# Patient Record
Sex: MALE | Race: WHITE | NOT HISPANIC OR LATINO | Employment: OTHER | URBAN - METROPOLITAN AREA
[De-identification: names, ages, dates, MRNs, and addresses within clinical notes are randomized per-mention and may not be internally consistent; named-entity substitution may affect disease eponyms.]

---

## 2023-01-28 PROBLEM — I10 HTN (HYPERTENSION): Status: ACTIVE | Noted: 2023-01-28

## 2023-01-28 PROBLEM — R73.03 PREDIABETES: Status: ACTIVE | Noted: 2023-01-28

## 2023-01-28 PROBLEM — R91.8 PULMONARY NODULES/LESIONS, MULTIPLE: Status: ACTIVE | Noted: 2023-01-28

## 2023-01-28 PROBLEM — I25.10 CAD IN NATIVE ARTERY: Status: ACTIVE | Noted: 2023-01-28

## 2023-01-28 PROBLEM — K63.5 COLON POLYPS: Status: ACTIVE | Noted: 2023-01-28

## 2023-01-28 PROBLEM — G47.30 OBSERVED SLEEP APNEA: Status: ACTIVE | Noted: 2023-01-28

## 2023-01-28 PROBLEM — M25.561 RIGHT KNEE PAIN: Status: ACTIVE | Noted: 2023-01-28

## 2023-01-28 PROBLEM — D22.9 MULTIPLE NEVI: Status: ACTIVE | Noted: 2023-01-28

## 2023-01-28 PROBLEM — E78.2 MIXED HYPERLIPIDEMIA: Status: ACTIVE | Noted: 2023-01-28

## 2023-01-28 PROBLEM — M17.11 PRIMARY OSTEOARTHRITIS OF RIGHT KNEE: Status: ACTIVE | Noted: 2023-01-28

## 2023-01-28 PROBLEM — R35.1 NOCTURIA: Status: ACTIVE | Noted: 2023-01-28

## 2023-01-28 RX ORDER — PHENOL 1.4 %
AEROSOL, SPRAY (ML) MUCOUS MEMBRANE
COMMUNITY
Start: 2017-09-26

## 2023-01-28 RX ORDER — VITAMIN B COMPLEX
CAPSULE ORAL
COMMUNITY

## 2023-01-28 RX ORDER — ASPIRIN 81 MG/1
1 TABLET ORAL DAILY
COMMUNITY
End: 2024-03-13 | Stop reason: ALTCHOICE

## 2023-01-28 RX ORDER — ATORVASTATIN CALCIUM 40 MG/1
1 TABLET, FILM COATED ORAL NIGHTLY
COMMUNITY
Start: 2019-04-17 | End: 2023-03-22

## 2023-01-28 RX ORDER — CHOLECALCIFEROL (VITAMIN D3) 125 MCG
CAPSULE ORAL
COMMUNITY

## 2023-01-28 RX ORDER — LISINOPRIL AND HYDROCHLOROTHIAZIDE 20; 25 MG/1; MG/1
1 TABLET ORAL DAILY
COMMUNITY
Start: 2017-07-25 | End: 2023-03-22

## 2023-03-13 ENCOUNTER — OFFICE VISIT (OUTPATIENT)
Dept: PRIMARY CARE | Facility: CLINIC | Age: 68
End: 2023-03-13
Payer: MEDICARE

## 2023-03-13 VITALS
TEMPERATURE: 97.5 F | BODY MASS INDEX: 34.73 KG/M2 | SYSTOLIC BLOOD PRESSURE: 118 MMHG | WEIGHT: 215.2 LBS | DIASTOLIC BLOOD PRESSURE: 84 MMHG

## 2023-03-13 DIAGNOSIS — R73.03 PREDIABETES: ICD-10-CM

## 2023-03-13 DIAGNOSIS — R35.1 NOCTURIA: ICD-10-CM

## 2023-03-13 DIAGNOSIS — I10 HYPERTENSION, UNSPECIFIED TYPE: ICD-10-CM

## 2023-03-13 DIAGNOSIS — I25.10 CAD IN NATIVE ARTERY: ICD-10-CM

## 2023-03-13 DIAGNOSIS — N52.8 OTHER MALE ERECTILE DYSFUNCTION: Primary | ICD-10-CM

## 2023-03-13 LAB — POC HEMOGLOBIN A1C: 6 % (ref 4.2–6.5)

## 2023-03-13 PROCEDURE — 83036 HEMOGLOBIN GLYCOSYLATED A1C: CPT | Performed by: FAMILY MEDICINE

## 2023-03-13 PROCEDURE — 3074F SYST BP LT 130 MM HG: CPT | Performed by: FAMILY MEDICINE

## 2023-03-13 PROCEDURE — 36416 COLLJ CAPILLARY BLOOD SPEC: CPT | Performed by: FAMILY MEDICINE

## 2023-03-13 PROCEDURE — 99214 OFFICE O/P EST MOD 30 MIN: CPT | Performed by: FAMILY MEDICINE

## 2023-03-13 PROCEDURE — 3079F DIAST BP 80-89 MM HG: CPT | Performed by: FAMILY MEDICINE

## 2023-03-13 PROCEDURE — 1159F MED LIST DOCD IN RCRD: CPT | Performed by: FAMILY MEDICINE

## 2023-03-13 PROCEDURE — 1036F TOBACCO NON-USER: CPT | Performed by: FAMILY MEDICINE

## 2023-03-13 RX ORDER — TADALAFIL 20 MG/1
20 TABLET ORAL DAILY PRN
Qty: 30 TABLET | Refills: 0 | Status: SHIPPED | OUTPATIENT
Start: 2023-03-13 | End: 2024-03-13 | Stop reason: SDUPTHER

## 2023-03-13 RX ORDER — MULTIVIT WITH IRON,MINERALS
TABLET,CHEWABLE ORAL DAILY
COMMUNITY

## 2023-03-13 ASSESSMENT — ENCOUNTER SYMPTOMS
LOSS OF SENSATION IN FEET: 0
SHORTNESS OF BREATH: 0
EYE PAIN: 0
ARTHRALGIAS: 0
PALPITATIONS: 0
FEVER: 0
WEAKNESS: 0
ABDOMINAL PAIN: 0
APPETITE CHANGE: 0
DYSPHORIC MOOD: 0
BRUISES/BLEEDS EASILY: 0
NERVOUS/ANXIOUS: 0
COUGH: 0
CONSTIPATION: 0
DIARRHEA: 0
NAUSEA: 0
DIFFICULTY URINATING: 0
BLOOD IN STOOL: 0
VOMITING: 0
OCCASIONAL FEELINGS OF UNSTEADINESS: 0
SINUS PAIN: 0
MYALGIAS: 0
DEPRESSION: 0
ADENOPATHY: 0
SLEEP DISTURBANCE: 0
HEADACHES: 0
ACTIVITY CHANGE: 0

## 2023-03-13 ASSESSMENT — PATIENT HEALTH QUESTIONNAIRE - PHQ9
SUM OF ALL RESPONSES TO PHQ9 QUESTIONS 1 AND 2: 0
2. FEELING DOWN, DEPRESSED OR HOPELESS: NOT AT ALL
1. LITTLE INTEREST OR PLEASURE IN DOING THINGS: NOT AT ALL

## 2023-03-13 NOTE — ASSESSMENT & PLAN NOTE
sx well-controlled on current medications. Follow-up with cardiology yearly unless concerns sooner

## 2023-03-13 NOTE — PROGRESS NOTES
Subjective   Patient ID: John Ochoa is a 67 y.o. male who presents for Follow-up ( ).    HPI   Care team   cardiology- Yana Wakefield-last seen 1/20 CAD asymptomatic found on elevated coronary artery calcium score-he is taking statin aspirin and fish oil. Had stress test 1/20 that showed no inducible ischemia  GI- Dr. Lehman -colonoscopy done 6/20 after bout of diverticulitis- next due 6/23 Dr. Lehman   Ortho-Had consult at [facility].  He is not interested in having replacement  derm- due for f/u    Nutritionist- working for weight loss    Wondering about use of cialis- has used in past. Works well  Would like refill    For his prediabetes and hyperlipidemia he has been working with a nutritionist.  Has had a little bit of weight loss  stopped smoking 3/2020- still doing well   working on cutting back alcohol - 1-2 drinks most days which is improvement from about 4/day -mostly bourbon  walking average 8000 steps a day   occ nocturia- x1     HM  colonoscopy done 7/20- next due 6/23 Dr. Lehman   PSA last done 9/22  mild nocturia   AAA screen normal 4/22  He has had coronary artery calcium score done 3/19 as above follows with cardiology  He has a 15-pack-year smoking history that was confused prior. No further indication for lung cancer screening test  He has had follow-up for lung nodules and does not need further follow-up unless symptoms develop    Review of Systems   Constitutional:  Negative for activity change, appetite change and fever.   HENT:  Negative for congestion, ear pain and sinus pain.    Eyes:  Negative for pain and visual disturbance.   Respiratory:  Negative for cough and shortness of breath.    Cardiovascular:  Negative for chest pain, palpitations and leg swelling.   Gastrointestinal:  Negative for abdominal pain, blood in stool, constipation, diarrhea, nausea and vomiting.   Endocrine: Negative for cold intolerance and heat intolerance.   Genitourinary:  Negative for  difficulty urinating.   Musculoskeletal:  Negative for arthralgias, gait problem and myalgias.   Skin:  Negative for rash.   Neurological:  Negative for weakness and headaches.   Hematological:  Negative for adenopathy. Does not bruise/bleed easily.   Psychiatric/Behavioral:  Negative for dysphoric mood and sleep disturbance. The patient is not nervous/anxious.        Objective   /84   Temp 36.4 °C (97.5 °F)   Wt 97.6 kg (215 lb 3.2 oz)   BMI 34.73 kg/m²     Physical Exam  Vitals and nursing note reviewed.   Constitutional:       Appearance: Normal appearance.   HENT:      Head: Normocephalic and atraumatic.      Nose: Nose normal.      Mouth/Throat:      Mouth: Mucous membranes are moist.   Eyes:      Pupils: Pupils are equal, round, and reactive to light.   Cardiovascular:      Rate and Rhythm: Normal rate and regular rhythm.      Pulses: Normal pulses.      Heart sounds: Normal heart sounds.   Pulmonary:      Effort: Pulmonary effort is normal.      Breath sounds: Normal breath sounds.   Musculoskeletal:         General: No swelling. Normal range of motion.      Cervical back: Normal range of motion and neck supple.   Skin:     Capillary Refill: Capillary refill takes less than 2 seconds.   Neurological:      General: No focal deficit present.      Mental Status: He is alert. Mental status is at baseline.   Psychiatric:         Mood and Affect: Mood normal.         Behavior: Behavior normal.         Thought Content: Thought content normal.         Judgment: Judgment normal.         Assessment/Plan       Bill Tilley, DO

## 2023-03-13 NOTE — PROGRESS NOTES
Subjective   Patient ID: John Ochoa is a 67 y.o. male who presents for Follow-up ( ).    HPI   Care team   cardiology- Yana Wakefield-last seen 1/20 CAD asymptomatic found on elevated coronary artery calcium score-he is taking statin aspirin and fish oil. Had stress test 1/20 that showed no inducible ischemia  GI- Dr. Lehman -colonoscopy done 6/20 after bout of diverticulitis- next due 6/23 Dr. Lehman   Ortho-Had consult at [facility].  He is not interested in having replacement  derm- due for f/u    Nutritionist- working for weight loss    Wondering about use of cialis- has used in past. Works well  Would like refill    For his prediabetes and hyperlipidemia he has been working with a nutritionist.  Has had a little bit of weight loss  stopped smoking 3/2020- still doing well   working on cutting back alcohol - 1-2 drinks most days which is improvement from about 4/day -mostly bourbon  walking average 8000 steps a day   occ nocturia- x1     HM  colonoscopy done 7/20- next due 6/23 Dr. Lehman   PSA last done 9/22  mild nocturia   AAA screen normal 4/22  He has had coronary artery calcium score done 3/19 as above follows with cardiology  He has a 15-pack-year smoking history that was confused prior. No further indication for lung cancer screening test  He has had follow-up for lung nodules and does not need further follow-up unless symptoms develop    Review of Systems   Constitutional:  Negative for activity change, appetite change and fever.   HENT:  Negative for congestion, ear pain and sinus pain.    Eyes:  Negative for pain and visual disturbance.   Respiratory:  Negative for cough and shortness of breath.    Cardiovascular:  Negative for chest pain, palpitations and leg swelling.   Gastrointestinal:  Negative for abdominal pain, blood in stool, constipation, diarrhea, nausea and vomiting.   Endocrine: Negative for cold intolerance and heat intolerance.   Genitourinary:  Negative for  difficulty urinating.   Musculoskeletal:  Negative for arthralgias, gait problem and myalgias.   Skin:  Negative for rash.   Neurological:  Negative for weakness and headaches.   Hematological:  Negative for adenopathy. Does not bruise/bleed easily.   Psychiatric/Behavioral:  Negative for dysphoric mood and sleep disturbance. The patient is not nervous/anxious.        Objective   /84   Temp 36.4 °C (97.5 °F)   Wt 97.6 kg (215 lb 3.2 oz)   BMI 34.73 kg/m²     Physical Exam  Vitals and nursing note reviewed.   Constitutional:       Appearance: Normal appearance.   HENT:      Head: Normocephalic and atraumatic.      Nose: Nose normal.      Mouth/Throat:      Mouth: Mucous membranes are moist.   Eyes:      Pupils: Pupils are equal, round, and reactive to light.   Cardiovascular:      Rate and Rhythm: Normal rate and regular rhythm.      Pulses: Normal pulses.      Heart sounds: Normal heart sounds.   Pulmonary:      Effort: Pulmonary effort is normal.      Breath sounds: Normal breath sounds.   Musculoskeletal:         General: No swelling. Normal range of motion.      Cervical back: Normal range of motion and neck supple.   Skin:     Capillary Refill: Capillary refill takes less than 2 seconds.   Neurological:      General: No focal deficit present.      Mental Status: He is alert. Mental status is at baseline.   Psychiatric:         Mood and Affect: Mood normal.         Behavior: Behavior normal.         Thought Content: Thought content normal.         Judgment: Judgment normal.         Assessment/Plan   Problem List Items Addressed This Visit          Circulatory    CAD in native artery     sx well-controlled on current medications. Follow-up with cardiology yearly unless concerns sooner          Relevant Medications    tadalafil (Cialis) 20 mg tablet    Other Relevant Orders    Lipid Panel    HTN (hypertension)     sx well-controlled on current medications. Follow-up with cardiology yearly unless concerns  sooner          Relevant Orders    CBC and Auto Differential       Genitourinary    Other male erectile dysfunction - Primary    Relevant Medications    tadalafil (Cialis) 20 mg tablet    Other Relevant Orders    Prostate Spec.Ag,Screen       Endocrine/Metabolic    Prediabetes     A1c today   Cont work on diet and exercise and check 6 months          Relevant Orders    POCT glycosylated hemoglobin (Hb A1C) manually resulted (Completed)    Follow Up In Primary Care    Lipid Panel    Comprehensive Metabolic Panel    Hemoglobin A1C       Other    Nocturia    Relevant Orders    Prostate Spec.Ag,Screen       Bill Tilley, DO

## 2023-03-22 ENCOUNTER — TELEPHONE (OUTPATIENT)
Dept: PRIMARY CARE | Facility: CLINIC | Age: 68
End: 2023-03-22
Payer: MEDICARE

## 2023-03-22 DIAGNOSIS — I10 HYPERTENSION, UNSPECIFIED TYPE: Primary | ICD-10-CM

## 2023-03-22 DIAGNOSIS — E78.2 MIXED HYPERLIPIDEMIA: ICD-10-CM

## 2023-03-22 RX ORDER — ATORVASTATIN CALCIUM 40 MG/1
TABLET, FILM COATED ORAL
Qty: 90 TABLET | Refills: 3 | Status: SHIPPED | OUTPATIENT
Start: 2023-03-22 | End: 2023-12-26

## 2023-03-22 RX ORDER — LISINOPRIL AND HYDROCHLOROTHIAZIDE 20; 25 MG/1; MG/1
1 TABLET ORAL
Qty: 90 TABLET | Refills: 3 | Status: SHIPPED | OUTPATIENT
Start: 2023-03-22 | End: 2023-12-26

## 2023-03-22 NOTE — TELEPHONE ENCOUNTER
Verbal refills for Lisinopril hydrochlorothiazide 20/25 1 pill by mouth daily #90 with 3 refills and Atorvastatin Calcium 40 mg 1 pill by mouth daily #90 with 3 refills given to Juliana at [facility].

## 2023-09-13 ENCOUNTER — LAB (OUTPATIENT)
Dept: LAB | Facility: LAB | Age: 68
End: 2023-09-13
Payer: MEDICARE

## 2023-09-13 ENCOUNTER — OFFICE VISIT (OUTPATIENT)
Dept: PRIMARY CARE | Facility: CLINIC | Age: 68
End: 2023-09-13
Payer: MEDICARE

## 2023-09-13 VITALS
BODY MASS INDEX: 34.46 KG/M2 | OXYGEN SATURATION: 98 % | HEIGHT: 66 IN | TEMPERATURE: 97.6 F | DIASTOLIC BLOOD PRESSURE: 78 MMHG | SYSTOLIC BLOOD PRESSURE: 130 MMHG | WEIGHT: 214.4 LBS | HEART RATE: 71 BPM

## 2023-09-13 DIAGNOSIS — K63.5 POLYP OF COLON, UNSPECIFIED PART OF COLON, UNSPECIFIED TYPE: ICD-10-CM

## 2023-09-13 DIAGNOSIS — R73.03 PREDIABETES: ICD-10-CM

## 2023-09-13 DIAGNOSIS — I25.10 CAD IN NATIVE ARTERY: ICD-10-CM

## 2023-09-13 DIAGNOSIS — Z12.11 SCREENING FOR COLORECTAL CANCER: ICD-10-CM

## 2023-09-13 DIAGNOSIS — N52.8 OTHER MALE ERECTILE DYSFUNCTION: ICD-10-CM

## 2023-09-13 DIAGNOSIS — R35.1 NOCTURIA: ICD-10-CM

## 2023-09-13 DIAGNOSIS — Z12.12 SCREENING FOR COLORECTAL CANCER: ICD-10-CM

## 2023-09-13 DIAGNOSIS — E78.2 MIXED HYPERLIPIDEMIA: ICD-10-CM

## 2023-09-13 DIAGNOSIS — I10 HYPERTENSION, UNSPECIFIED TYPE: ICD-10-CM

## 2023-09-13 DIAGNOSIS — Z00.00 ROUTINE GENERAL MEDICAL EXAMINATION AT HEALTH CARE FACILITY: Primary | ICD-10-CM

## 2023-09-13 DIAGNOSIS — M17.11 PRIMARY OSTEOARTHRITIS OF RIGHT KNEE: ICD-10-CM

## 2023-09-13 LAB
ALANINE AMINOTRANSFERASE (SGPT) (U/L) IN SER/PLAS: 32 U/L (ref 10–52)
ALBUMIN (G/DL) IN SER/PLAS: 4.2 G/DL (ref 3.4–5)
ALKALINE PHOSPHATASE (U/L) IN SER/PLAS: 45 U/L (ref 33–136)
ANION GAP IN SER/PLAS: 12 MMOL/L (ref 10–20)
ASPARTATE AMINOTRANSFERASE (SGOT) (U/L) IN SER/PLAS: 23 U/L (ref 9–39)
BASOPHILS (10*3/UL) IN BLOOD BY AUTOMATED COUNT: 0.07 X10E9/L (ref 0–0.1)
BASOPHILS/100 LEUKOCYTES IN BLOOD BY AUTOMATED COUNT: 1.2 % (ref 0–2)
BILIRUBIN TOTAL (MG/DL) IN SER/PLAS: 0.7 MG/DL (ref 0–1.2)
CALCIUM (MG/DL) IN SER/PLAS: 9.5 MG/DL (ref 8.6–10.3)
CARBON DIOXIDE, TOTAL (MMOL/L) IN SER/PLAS: 29 MMOL/L (ref 21–32)
CHLORIDE (MMOL/L) IN SER/PLAS: 101 MMOL/L (ref 98–107)
CHOLESTEROL (MG/DL) IN SER/PLAS: 154 MG/DL (ref 0–199)
CHOLESTEROL IN HDL (MG/DL) IN SER/PLAS: 37 MG/DL
CHOLESTEROL/HDL RATIO: 4.2
CREATININE (MG/DL) IN SER/PLAS: 0.83 MG/DL (ref 0.5–1.3)
EOSINOPHILS (10*3/UL) IN BLOOD BY AUTOMATED COUNT: 0.12 X10E9/L (ref 0–0.7)
EOSINOPHILS/100 LEUKOCYTES IN BLOOD BY AUTOMATED COUNT: 2.1 % (ref 0–6)
ERYTHROCYTE DISTRIBUTION WIDTH (RATIO) BY AUTOMATED COUNT: 12.5 % (ref 11.5–14.5)
ERYTHROCYTE MEAN CORPUSCULAR HEMOGLOBIN CONCENTRATION (G/DL) BY AUTOMATED: 32.3 G/DL (ref 32–36)
ERYTHROCYTE MEAN CORPUSCULAR VOLUME (FL) BY AUTOMATED COUNT: 97 FL (ref 80–100)
ERYTHROCYTES (10*6/UL) IN BLOOD BY AUTOMATED COUNT: 4.67 X10E12/L (ref 4.5–5.9)
ESTIMATED AVERAGE GLUCOSE FOR HBA1C: 140 MG/DL
GFR MALE: >90 ML/MIN/1.73M2
GLUCOSE (MG/DL) IN SER/PLAS: 110 MG/DL (ref 74–99)
HEMATOCRIT (%) IN BLOOD BY AUTOMATED COUNT: 45.5 % (ref 41–52)
HEMOGLOBIN (G/DL) IN BLOOD: 14.7 G/DL (ref 13.5–17.5)
HEMOGLOBIN A1C/HEMOGLOBIN TOTAL IN BLOOD: 6.5 %
IMMATURE GRANULOCYTES/100 LEUKOCYTES IN BLOOD BY AUTOMATED COUNT: 1 % (ref 0–0.9)
LDL: 71 MG/DL (ref 0–99)
LEUKOCYTES (10*3/UL) IN BLOOD BY AUTOMATED COUNT: 5.8 X10E9/L (ref 4.4–11.3)
LYMPHOCYTES (10*3/UL) IN BLOOD BY AUTOMATED COUNT: 1.85 X10E9/L (ref 1.2–4.8)
LYMPHOCYTES/100 LEUKOCYTES IN BLOOD BY AUTOMATED COUNT: 31.8 % (ref 13–44)
MONOCYTES (10*3/UL) IN BLOOD BY AUTOMATED COUNT: 0.59 X10E9/L (ref 0.1–1)
MONOCYTES/100 LEUKOCYTES IN BLOOD BY AUTOMATED COUNT: 10.2 % (ref 2–10)
NEUTROPHILS (10*3/UL) IN BLOOD BY AUTOMATED COUNT: 3.12 X10E9/L (ref 1.2–7.7)
NEUTROPHILS/100 LEUKOCYTES IN BLOOD BY AUTOMATED COUNT: 53.7 % (ref 40–80)
NON HDL CHOLESTEROL: 117 MG/DL
PLATELETS (10*3/UL) IN BLOOD AUTOMATED COUNT: 259 X10E9/L (ref 150–450)
POTASSIUM (MMOL/L) IN SER/PLAS: 4.3 MMOL/L (ref 3.5–5.3)
PROSTATE SPECIFIC ANTIGEN,SCREEN: 1.24 NG/ML (ref 0–4)
PROTEIN TOTAL: 7.1 G/DL (ref 6.4–8.2)
SODIUM (MMOL/L) IN SER/PLAS: 138 MMOL/L (ref 136–145)
TRIGLYCERIDE (MG/DL) IN SER/PLAS: 230 MG/DL (ref 0–149)
UREA NITROGEN (MG/DL) IN SER/PLAS: 15 MG/DL (ref 6–23)
VLDL: 46 MG/DL (ref 0–40)

## 2023-09-13 PROCEDURE — 1036F TOBACCO NON-USER: CPT | Performed by: FAMILY MEDICINE

## 2023-09-13 PROCEDURE — 3075F SYST BP GE 130 - 139MM HG: CPT | Performed by: FAMILY MEDICINE

## 2023-09-13 PROCEDURE — 85025 COMPLETE CBC W/AUTO DIFF WBC: CPT

## 2023-09-13 PROCEDURE — 3078F DIAST BP <80 MM HG: CPT | Performed by: FAMILY MEDICINE

## 2023-09-13 PROCEDURE — 84153 ASSAY OF PSA TOTAL: CPT

## 2023-09-13 PROCEDURE — 3008F BODY MASS INDEX DOCD: CPT | Performed by: FAMILY MEDICINE

## 2023-09-13 PROCEDURE — 1170F FXNL STATUS ASSESSED: CPT | Performed by: FAMILY MEDICINE

## 2023-09-13 PROCEDURE — 80061 LIPID PANEL: CPT

## 2023-09-13 PROCEDURE — G0439 PPPS, SUBSEQ VISIT: HCPCS | Performed by: FAMILY MEDICINE

## 2023-09-13 PROCEDURE — 80053 COMPREHEN METABOLIC PANEL: CPT

## 2023-09-13 PROCEDURE — 36415 COLL VENOUS BLD VENIPUNCTURE: CPT

## 2023-09-13 PROCEDURE — 1160F RVW MEDS BY RX/DR IN RCRD: CPT | Performed by: FAMILY MEDICINE

## 2023-09-13 PROCEDURE — 99214 OFFICE O/P EST MOD 30 MIN: CPT | Performed by: FAMILY MEDICINE

## 2023-09-13 PROCEDURE — 83036 HEMOGLOBIN GLYCOSYLATED A1C: CPT

## 2023-09-13 PROCEDURE — 1159F MED LIST DOCD IN RCRD: CPT | Performed by: FAMILY MEDICINE

## 2023-09-13 ASSESSMENT — ENCOUNTER SYMPTOMS
COUGH: 0
SINUS PAIN: 0
SHORTNESS OF BREATH: 0
BLOOD IN STOOL: 0
NERVOUS/ANXIOUS: 0
DYSPHORIC MOOD: 0
ARTHRALGIAS: 1
DIFFICULTY URINATING: 0
CONSTIPATION: 0
PALPITATIONS: 0
APPETITE CHANGE: 0
SLEEP DISTURBANCE: 0
ACTIVITY CHANGE: 0
VOMITING: 0
NAUSEA: 0
FEVER: 0
WEAKNESS: 0
DIARRHEA: 0
MYALGIAS: 0
EYE PAIN: 0
HEADACHES: 0
BRUISES/BLEEDS EASILY: 0
ADENOPATHY: 0
ABDOMINAL PAIN: 0

## 2023-09-13 ASSESSMENT — ACTIVITIES OF DAILY LIVING (ADL)
GROCERY_SHOPPING: INDEPENDENT
DOING_HOUSEWORK: INDEPENDENT
DRESSING: INDEPENDENT
TAKING_MEDICATION: INDEPENDENT
MANAGING_FINANCES: INDEPENDENT
BATHING: INDEPENDENT

## 2023-09-13 ASSESSMENT — PATIENT HEALTH QUESTIONNAIRE - PHQ9
1. LITTLE INTEREST OR PLEASURE IN DOING THINGS: NOT AT ALL
1. LITTLE INTEREST OR PLEASURE IN DOING THINGS: NOT AT ALL
SUM OF ALL RESPONSES TO PHQ9 QUESTIONS 1 AND 2: 0
SUM OF ALL RESPONSES TO PHQ9 QUESTIONS 1 AND 2: 0
2. FEELING DOWN, DEPRESSED OR HOPELESS: NOT AT ALL
2. FEELING DOWN, DEPRESSED OR HOPELESS: NOT AT ALL

## 2023-09-13 NOTE — PATIENT INSTRUCTIONS
Recommendations for men annual wellness exam:  Colonoscopy at age 45 or earlier if positive family history of colon cancer  Discuss prostate cancer screening between ages 55-69 or sooner if family history of prostate cancer  One time screen for abdominal aortic aneurysm for men ages 65-75 who have ever smoked  Screening for lung cancer with low-dose CT in all adults age 55-77 who have a 30 pack-year smoking history and currently smoke or have quit within the past 15 years  Follow a healthy diet (Dash diet, Mediterranean diet)  Exercise 150 min/wk  Maintain healthy weight (BMI < 25)  Do not smoke  Alcohol in moderation (up to 2 drinks/day)  Get enough sleep (7-8 hours/night)  Make sure immunizations are up to date (influenza, pneumococcal, Tdap, shingles if age > 50)  Visit dentist twice yearly

## 2023-09-13 NOTE — PROGRESS NOTES
Subjective   Reason for Visit: John Ochoa is an 68 y.o. male here for a Medicare Wellness visit.     Care team   cardiology- Yana Wakefield-last seen 1/20 CAD asymptomatic found on elevated coronary artery calcium score-he is taking statin aspirin and fish oil. Had stress test 1/20 that showed no inducible ischemia  GI- Dr. Lehman -colonoscopy done 6/20 after bout of diverticulitis- next due 6/23 Dr. Lehman - ordered but not yet done   Ortho-Had consult at [facility].  He is not interested in having replacement- still with pain but no other tx wanting to try   derm- due for f/u    Nutritionist- working for weight loss        For his prediabetes and hyperlipidemia he has been working with a nutritionist.  trying to increase his protein   stopped smoking 3/2020- still doing well   working on cutting back alcohol - 1-2 drinks most days which is improvement from about 4/day -mostly bourbon  walking average 6000 steps a day but has been limited by knees     HM  colonoscopy done 7/20- next due 6/23 Dr. Lehman -order in   PSA last done 9/22  mild nocturia   AAA screen normal 4/22  He has had coronary artery calcium score done 3/19 as above follows with cardiology  He has a 15-pack-year smoking history that was confused prior. No further indication for lung cancer screening test  He has had follow-up for lung nodules and does not need further follow-up unless symptoms develop    Past Medical, Surgical, and Family History reviewed and updated in chart.    Reviewed all medications by prescribing practitioner or clinical pharmacist (such as prescriptions, OTCs, herbal therapies and supplements) and documented in the medical record.    HPI    Patient Self Assessment of Health Status  Patient Self Assessment: Good    Nutrition and Exercise  Current Diet: Well Balanced Diet  Adequate Fluid Intake: Yes  Caffeine: Yes  Exercise Frequency: Infrequently    Functional Ability/Level of Safety  Cognitive Impairment Observed: No  "cognitive impairment observed  Cognitive Impairment Reported: No cognitive impairment reported by patient or family    Home Safety Risk Factors: None    Patient Care Team:  Bill Tilley DO as PCP - General  Bill Tilley DO as PCP - [employer] PCP     Review of Systems   Constitutional:  Negative for activity change, appetite change and fever.   HENT:  Negative for congestion, ear pain and sinus pain.    Eyes:  Negative for pain and visual disturbance.   Respiratory:  Negative for cough and shortness of breath.    Cardiovascular:  Negative for chest pain, palpitations and leg swelling.   Gastrointestinal:  Negative for abdominal pain, blood in stool, constipation, diarrhea, nausea and vomiting.   Endocrine: Negative for cold intolerance and heat intolerance.   Genitourinary:  Negative for difficulty urinating.   Musculoskeletal:  Positive for arthralgias (knees chronic). Negative for gait problem and myalgias.   Skin:  Negative for rash.   Neurological:  Negative for weakness and headaches.   Hematological:  Negative for adenopathy. Does not bruise/bleed easily.   Psychiatric/Behavioral:  Negative for dysphoric mood and sleep disturbance. The patient is not nervous/anxious.        Objective   Vitals:  /78   Pulse 71   Temp 36.4 °C (97.6 °F)   Ht 1.676 m (5' 6\")   Wt 97.3 kg (214 lb 6.4 oz)   SpO2 98%   BMI 34.61 kg/m²       Physical Exam  Vitals and nursing note reviewed.   Constitutional:       Appearance: Normal appearance.   HENT:      Head: Normocephalic and atraumatic.      Right Ear: Tympanic membrane, ear canal and external ear normal.      Left Ear: Tympanic membrane, ear canal and external ear normal.      Nose: Nose normal.      Mouth/Throat:      Mouth: Mucous membranes are moist.      Pharynx: No oropharyngeal exudate or posterior oropharyngeal erythema.   Eyes:      Conjunctiva/sclera: Conjunctivae normal.      Pupils: Pupils are equal, round, and reactive to light. "   Cardiovascular:      Rate and Rhythm: Normal rate and regular rhythm.      Pulses: Normal pulses.      Heart sounds: Normal heart sounds.   Pulmonary:      Effort: Pulmonary effort is normal.      Breath sounds: Normal breath sounds.   Abdominal:      General: Abdomen is flat. Bowel sounds are normal.      Palpations: Abdomen is soft.   Musculoskeletal:         General: No swelling. Normal range of motion.      Cervical back: Normal range of motion and neck supple.      Right lower leg: No edema.      Left lower leg: No edema.   Skin:     General: Skin is warm and dry.      Capillary Refill: Capillary refill takes less than 2 seconds.   Neurological:      General: No focal deficit present.      Mental Status: He is alert and oriented to person, place, and time. Mental status is at baseline.   Psychiatric:         Mood and Affect: Mood normal.         Behavior: Behavior normal.         Thought Content: Thought content normal.         Judgment: Judgment normal.         Assessment/Plan   Problem List Items Addressed This Visit       CAD in native artery    Colon polyps    HTN (hypertension)    Mixed hyperlipidemia    Prediabetes    Primary osteoarthritis of right knee     Other Visit Diagnoses       Routine general medical examination at health care facility    -  Primary    Screening for colorectal cancer        Relevant Orders    Colonoscopy Screening    BMI 34.0-34.9,adult              Labs due ordered last visit overdue- he will do today- fu tbd  Rec colonoscopy  Declines vaccines

## 2023-09-15 ENCOUNTER — TELEPHONE (OUTPATIENT)
Dept: PRIMARY CARE | Facility: CLINIC | Age: 68
End: 2023-09-15
Payer: MEDICARE

## 2023-09-15 DIAGNOSIS — D72.9 ABNORMAL WBC COUNT: Primary | ICD-10-CM

## 2023-09-15 DIAGNOSIS — R73.03 PREDIABETES: ICD-10-CM

## 2023-09-15 DIAGNOSIS — R97.20 INCREASED PROSTATE SPECIFIC ANTIGEN (PSA) VELOCITY: ICD-10-CM

## 2023-09-15 NOTE — RESULT ENCOUNTER NOTE
Please call the patient regarding his abnormal result.    Let him know his sugar is creeping up and for the first time it is in diabetic range.  Work on healthy eating and exercise and we will check with his follow-up in 6 months   His PSA level is within normal range but has crept up a little bit as well so we will recheck that in 6 months  One of his white blood cell count was very mildly elevated so I would like to recheck that in 2 to 4 weeks.  Order is in  **DO NOT CLOSE UNTIL YOU SPEAK TO PATIENT**

## 2023-10-09 ENCOUNTER — LAB (OUTPATIENT)
Dept: LAB | Facility: LAB | Age: 68
End: 2023-10-09
Payer: MEDICARE

## 2023-10-09 DIAGNOSIS — D72.9 ABNORMAL WBC COUNT: ICD-10-CM

## 2023-10-09 LAB
BASOPHILS # BLD AUTO: 0.04 X10*3/UL (ref 0–0.1)
BASOPHILS NFR BLD AUTO: 0.7 %
EOSINOPHIL # BLD AUTO: 0.17 X10*3/UL (ref 0–0.7)
EOSINOPHIL NFR BLD AUTO: 2.9 %
ERYTHROCYTE [DISTWIDTH] IN BLOOD BY AUTOMATED COUNT: 12.5 % (ref 11.5–14.5)
HCT VFR BLD AUTO: 41.7 % (ref 41–52)
HGB BLD-MCNC: 13.6 G/DL (ref 13.5–17.5)
IMM GRANULOCYTES # BLD AUTO: 0.03 X10*3/UL (ref 0–0.7)
IMM GRANULOCYTES NFR BLD AUTO: 0.5 % (ref 0–0.9)
LYMPHOCYTES # BLD AUTO: 2.24 X10*3/UL (ref 1.2–4.8)
LYMPHOCYTES NFR BLD AUTO: 38.8 %
MCH RBC QN AUTO: 31.9 PG (ref 26–34)
MCHC RBC AUTO-ENTMCNC: 32.6 G/DL (ref 32–36)
MCV RBC AUTO: 98 FL (ref 80–100)
MONOCYTES # BLD AUTO: 0.49 X10*3/UL (ref 0.1–1)
MONOCYTES NFR BLD AUTO: 8.5 %
NEUTROPHILS # BLD AUTO: 2.8 X10*3/UL (ref 1.2–7.7)
NEUTROPHILS NFR BLD AUTO: 48.6 %
NRBC BLD-RTO: 0 /100 WBCS (ref 0–0)
PLATELET # BLD AUTO: 257 X10*3/UL (ref 150–450)
PMV BLD AUTO: 10.8 FL (ref 7.5–11.5)
RBC # BLD AUTO: 4.27 X10*6/UL (ref 4.5–5.9)
WBC # BLD AUTO: 5.8 X10*3/UL (ref 4.4–11.3)

## 2023-10-09 PROCEDURE — 85025 COMPLETE CBC W/AUTO DIFF WBC: CPT

## 2023-10-09 PROCEDURE — 36415 COLL VENOUS BLD VENIPUNCTURE: CPT

## 2023-10-10 ENCOUNTER — TELEPHONE (OUTPATIENT)
Dept: PRIMARY CARE | Facility: CLINIC | Age: 68
End: 2023-10-10
Payer: MEDICARE

## 2023-10-23 ENCOUNTER — OFFICE VISIT (OUTPATIENT)
Dept: GASTROENTEROLOGY | Facility: EXTERNAL LOCATION | Age: 68
End: 2023-10-23
Payer: MEDICARE

## 2023-10-23 DIAGNOSIS — Z86.010 PERSONAL HISTORY OF COLONIC POLYPS: Primary | ICD-10-CM

## 2023-10-23 DIAGNOSIS — Z12.12 SCREENING FOR COLORECTAL CANCER: ICD-10-CM

## 2023-10-23 DIAGNOSIS — G47.30 SLEEP APNEA, UNSPECIFIED TYPE: ICD-10-CM

## 2023-10-23 DIAGNOSIS — D12.5 BENIGN NEOPLASM OF SIGMOID COLON: ICD-10-CM

## 2023-10-23 DIAGNOSIS — Z12.11 SCREENING FOR COLORECTAL CANCER: ICD-10-CM

## 2023-10-23 DIAGNOSIS — K57.30 DIVERTICULOSIS OF LARGE INTESTINE WITHOUT DIVERTICULITIS: ICD-10-CM

## 2023-10-23 DIAGNOSIS — I10 ESSENTIAL HYPERTENSION, BENIGN: ICD-10-CM

## 2023-10-23 PROCEDURE — 45385 COLONOSCOPY W/LESION REMOVAL: CPT | Performed by: INTERNAL MEDICINE

## 2023-10-23 PROCEDURE — 1160F RVW MEDS BY RX/DR IN RCRD: CPT | Performed by: INTERNAL MEDICINE

## 2023-10-23 PROCEDURE — 3008F BODY MASS INDEX DOCD: CPT | Performed by: INTERNAL MEDICINE

## 2023-10-23 PROCEDURE — 1036F TOBACCO NON-USER: CPT | Performed by: INTERNAL MEDICINE

## 2023-10-23 PROCEDURE — 1159F MED LIST DOCD IN RCRD: CPT | Performed by: INTERNAL MEDICINE

## 2023-10-23 PROCEDURE — 88305 TISSUE EXAM BY PATHOLOGIST: CPT | Performed by: STUDENT IN AN ORGANIZED HEALTH CARE EDUCATION/TRAINING PROGRAM

## 2023-10-23 PROCEDURE — 88305 TISSUE EXAM BY PATHOLOGIST: CPT

## 2023-10-24 ENCOUNTER — LAB REQUISITION (OUTPATIENT)
Dept: LAB | Facility: HOSPITAL | Age: 68
End: 2023-10-24
Payer: MEDICARE

## 2023-11-10 LAB
LABORATORY COMMENT REPORT: NORMAL
PATH REPORT.FINAL DX SPEC: NORMAL
PATH REPORT.GROSS SPEC: NORMAL
PATH REPORT.RELEVANT HX SPEC: NORMAL
PATH REPORT.TOTAL CANCER: NORMAL

## 2023-12-22 DIAGNOSIS — E78.2 MIXED HYPERLIPIDEMIA: ICD-10-CM

## 2023-12-22 DIAGNOSIS — I10 HYPERTENSION, UNSPECIFIED TYPE: ICD-10-CM

## 2023-12-26 RX ORDER — ATORVASTATIN CALCIUM 40 MG/1
TABLET, FILM COATED ORAL
Qty: 100 TABLET | Refills: 2 | Status: SHIPPED | OUTPATIENT
Start: 2023-12-26

## 2023-12-26 RX ORDER — LISINOPRIL AND HYDROCHLOROTHIAZIDE 20; 25 MG/1; MG/1
1 TABLET ORAL
Qty: 100 TABLET | Refills: 2 | Status: SHIPPED | OUTPATIENT
Start: 2023-12-26

## 2024-03-13 ENCOUNTER — OFFICE VISIT (OUTPATIENT)
Dept: PRIMARY CARE | Facility: CLINIC | Age: 69
End: 2024-03-13
Payer: MEDICARE

## 2024-03-13 ENCOUNTER — LAB (OUTPATIENT)
Dept: LAB | Facility: LAB | Age: 69
End: 2024-03-13
Payer: MEDICARE

## 2024-03-13 VITALS
WEIGHT: 193.2 LBS | SYSTOLIC BLOOD PRESSURE: 110 MMHG | TEMPERATURE: 97.3 F | BODY MASS INDEX: 31.18 KG/M2 | DIASTOLIC BLOOD PRESSURE: 70 MMHG

## 2024-03-13 DIAGNOSIS — E78.2 MIXED HYPERLIPIDEMIA: ICD-10-CM

## 2024-03-13 DIAGNOSIS — R35.1 NOCTURIA: ICD-10-CM

## 2024-03-13 DIAGNOSIS — I10 HYPERTENSION, UNSPECIFIED TYPE: ICD-10-CM

## 2024-03-13 DIAGNOSIS — R73.03 PREDIABETES: ICD-10-CM

## 2024-03-13 DIAGNOSIS — N52.8 OTHER MALE ERECTILE DYSFUNCTION: ICD-10-CM

## 2024-03-13 DIAGNOSIS — M17.11 PRIMARY OSTEOARTHRITIS OF RIGHT KNEE: Primary | ICD-10-CM

## 2024-03-13 LAB
ALBUMIN SERPL BCP-MCNC: 4.4 G/DL (ref 3.4–5)
ALP SERPL-CCNC: 50 U/L (ref 33–136)
ALT SERPL W P-5'-P-CCNC: 21 U/L (ref 10–52)
ANION GAP SERPL CALC-SCNC: 12 MMOL/L (ref 10–20)
AST SERPL W P-5'-P-CCNC: 19 U/L (ref 9–39)
BILIRUB SERPL-MCNC: 1.1 MG/DL (ref 0–1.2)
BUN SERPL-MCNC: 18 MG/DL (ref 6–23)
CALCIUM SERPL-MCNC: 9.6 MG/DL (ref 8.6–10.3)
CHLORIDE SERPL-SCNC: 99 MMOL/L (ref 98–107)
CHOLEST SERPL-MCNC: 149 MG/DL (ref 0–199)
CHOLESTEROL/HDL RATIO: 3.3
CO2 SERPL-SCNC: 29 MMOL/L (ref 21–32)
CREAT SERPL-MCNC: 0.88 MG/DL (ref 0.5–1.3)
EGFRCR SERPLBLD CKD-EPI 2021: >90 ML/MIN/1.73M*2
GLUCOSE SERPL-MCNC: 94 MG/DL (ref 74–99)
HDLC SERPL-MCNC: 44.8 MG/DL
LDLC SERPL CALC-MCNC: 80 MG/DL
NON HDL CHOLESTEROL: 104 MG/DL (ref 0–149)
POTASSIUM SERPL-SCNC: 4.2 MMOL/L (ref 3.5–5.3)
PROT SERPL-MCNC: 7.1 G/DL (ref 6.4–8.2)
PSA SERPL-MCNC: 1.21 NG/ML
SODIUM SERPL-SCNC: 136 MMOL/L (ref 136–145)
TRIGL SERPL-MCNC: 123 MG/DL (ref 0–149)
VLDL: 25 MG/DL (ref 0–40)

## 2024-03-13 PROCEDURE — 83036 HEMOGLOBIN GLYCOSYLATED A1C: CPT

## 2024-03-13 PROCEDURE — 80061 LIPID PANEL: CPT

## 2024-03-13 PROCEDURE — 3078F DIAST BP <80 MM HG: CPT | Performed by: FAMILY MEDICINE

## 2024-03-13 PROCEDURE — 80053 COMPREHEN METABOLIC PANEL: CPT

## 2024-03-13 PROCEDURE — 1036F TOBACCO NON-USER: CPT | Performed by: FAMILY MEDICINE

## 2024-03-13 PROCEDURE — 1160F RVW MEDS BY RX/DR IN RCRD: CPT | Performed by: FAMILY MEDICINE

## 2024-03-13 PROCEDURE — 1159F MED LIST DOCD IN RCRD: CPT | Performed by: FAMILY MEDICINE

## 2024-03-13 PROCEDURE — 3008F BODY MASS INDEX DOCD: CPT | Performed by: FAMILY MEDICINE

## 2024-03-13 PROCEDURE — 36415 COLL VENOUS BLD VENIPUNCTURE: CPT

## 2024-03-13 PROCEDURE — 3074F SYST BP LT 130 MM HG: CPT | Performed by: FAMILY MEDICINE

## 2024-03-13 PROCEDURE — 84153 ASSAY OF PSA TOTAL: CPT

## 2024-03-13 PROCEDURE — 99214 OFFICE O/P EST MOD 30 MIN: CPT | Performed by: FAMILY MEDICINE

## 2024-03-13 RX ORDER — TADALAFIL 20 MG/1
20 TABLET ORAL DAILY PRN
Qty: 30 TABLET | Refills: 0 | Status: SHIPPED | OUTPATIENT
Start: 2024-03-13 | End: 2025-03-13

## 2024-03-13 ASSESSMENT — ENCOUNTER SYMPTOMS
CONSTIPATION: 0
HEADACHES: 0
DIFFICULTY URINATING: 0
SINUS PAIN: 0
ARTHRALGIAS: 0
BRUISES/BLEEDS EASILY: 0
MYALGIAS: 0
WEAKNESS: 0
ABDOMINAL PAIN: 0
DYSPHORIC MOOD: 0
DIARRHEA: 0
BLOOD IN STOOL: 0
ACTIVITY CHANGE: 0
VOMITING: 0
FEVER: 0
COUGH: 0
SLEEP DISTURBANCE: 0
NERVOUS/ANXIOUS: 0
EYE PAIN: 0
NAUSEA: 0
APPETITE CHANGE: 0
ADENOPATHY: 0
SHORTNESS OF BREATH: 0
PALPITATIONS: 0

## 2024-03-13 ASSESSMENT — PATIENT HEALTH QUESTIONNAIRE - PHQ9
2. FEELING DOWN, DEPRESSED OR HOPELESS: NOT AT ALL
1. LITTLE INTEREST OR PLEASURE IN DOING THINGS: NOT AT ALL
SUM OF ALL RESPONSES TO PHQ9 QUESTIONS 1 AND 2: 0

## 2024-03-13 NOTE — PROGRESS NOTES
Subjective   Patient ID: John Ochoa is a 68 y.o. male who presents for Follow-up.    HPI   Care team   cardiology- Yana Wakefield-last seen 1/20 CAD asymptomatic found on elevated coronary artery calcium score-he is taking statin aspirin and fish oil. Had stress test 1/20 that showed no inducible ischemia  GI- Dr. Lehman -colonoscopy done 6/20 after bout of diverticulitis- next due 6/23 Dr. Lehman   Ortho-Had consult at [facility].  He is not interested in having replacement  derm- due for f/u    Nutritionist- working for weight loss     ED- uses cialis-  Works well  Would like refill    For his prediabetes and hyperlipidemia he has been working with a nutritionist. Doing keto - down 22 lbs  stopped smoking 3/2020- still doing well   working on cutting back alcohol - 1-2 drinks most days which is improvement from about 4/day -mostly bourbon  walking average 8000 steps a day   occ nocturia- x1     HM  colonoscopy done 10/23- Dr. Lehman due 2026  PSA recheck due  AAA screen normal 4/22  He has had coronary artery calcium score done 3/19 as above follows with cardiology  He has a 15-pack-year smoking history that was confused prior. No further indication for lung cancer screening test  He has had follow-up for lung nodules and does not need further follow-up unless symptoms develop    Review of Systems   Constitutional:  Negative for activity change, appetite change and fever.   HENT:  Negative for congestion, ear pain and sinus pain.    Eyes:  Negative for pain and visual disturbance.   Respiratory:  Negative for cough and shortness of breath.    Cardiovascular:  Negative for chest pain, palpitations and leg swelling.   Gastrointestinal:  Negative for abdominal pain, blood in stool, constipation, diarrhea, nausea and vomiting.   Endocrine: Negative for cold intolerance and heat intolerance.   Genitourinary:  Negative for difficulty urinating.   Musculoskeletal:  Negative for arthralgias, gait problem and myalgias.    Skin:  Negative for rash.   Neurological:  Negative for weakness and headaches.   Hematological:  Negative for adenopathy. Does not bruise/bleed easily.   Psychiatric/Behavioral:  Negative for dysphoric mood and sleep disturbance. The patient is not nervous/anxious.        Objective   /70   Temp 36.3 °C (97.3 °F)   Wt 87.6 kg (193 lb 3.2 oz)   BMI 31.18 kg/m²     Physical Exam  Vitals and nursing note reviewed.   Constitutional:       Appearance: Normal appearance.   HENT:      Head: Normocephalic and atraumatic.      Nose: Nose normal.      Mouth/Throat:      Mouth: Mucous membranes are moist.   Eyes:      Pupils: Pupils are equal, round, and reactive to light.   Cardiovascular:      Rate and Rhythm: Normal rate and regular rhythm.      Pulses: Normal pulses.      Heart sounds: Normal heart sounds.   Pulmonary:      Effort: Pulmonary effort is normal.      Breath sounds: Normal breath sounds.   Musculoskeletal:         General: No swelling. Normal range of motion.      Cervical back: Normal range of motion and neck supple.   Skin:     Capillary Refill: Capillary refill takes less than 2 seconds.   Neurological:      General: No focal deficit present.      Mental Status: He is alert. Mental status is at baseline.   Psychiatric:         Mood and Affect: Mood normal.         Behavior: Behavior normal.         Thought Content: Thought content normal.         Judgment: Judgment normal.         Assessment/Plan   Problem List Items Addressed This Visit       HTN (hypertension)    Mixed hyperlipidemia    Relevant Orders    Lipid Panel    Comprehensive Metabolic Panel    Nocturia    Relevant Orders    Prostate Specific Antigen, Screen    Prediabetes    Relevant Orders    Hemoglobin A1C    Primary osteoarthritis of right knee - Primary    Relevant Orders    Disability Placard    Other male erectile dysfunction    Relevant Medications    tadalafil (Cialis) 20 mg tablet   Updates as above for labs  Dw pt keto not  best diet for CAD- eating a lot of red meat/pork   Bill E Treva, DO

## 2024-03-14 LAB
EST. AVERAGE GLUCOSE BLD GHB EST-MCNC: 140 MG/DL
HBA1C MFR BLD: 6.5 %

## 2024-06-13 ENCOUNTER — TELEPHONE (OUTPATIENT)
Dept: PRIMARY CARE | Facility: CLINIC | Age: 69
End: 2024-06-13
Payer: MEDICARE

## 2024-06-13 DIAGNOSIS — I10 HYPERTENSION, UNSPECIFIED TYPE: ICD-10-CM

## 2024-06-13 DIAGNOSIS — E78.2 MIXED HYPERLIPIDEMIA: ICD-10-CM

## 2024-06-13 RX ORDER — ATORVASTATIN CALCIUM 40 MG/1
40 TABLET, FILM COATED ORAL NIGHTLY
Qty: 100 TABLET | Refills: 2 | Status: SHIPPED | OUTPATIENT
Start: 2024-06-13

## 2024-06-13 RX ORDER — LISINOPRIL AND HYDROCHLOROTHIAZIDE 20; 25 MG/1; MG/1
1 TABLET ORAL DAILY
Qty: 100 TABLET | Refills: 3 | Status: SHIPPED | OUTPATIENT
Start: 2024-06-13 | End: 2025-07-18

## 2024-06-13 NOTE — TELEPHONE ENCOUNTER
PT CALLED TO STATE NEEDS REFILLS ON THE FOLLOWING    LIPITOR 40 MG 1 DAILY  LISINOPRIL/hydrochlorothiazide   20/25 1 DAILY    APT SET UP FOR 09/13/24    [place] [phone]

## 2024-09-13 ENCOUNTER — APPOINTMENT (OUTPATIENT)
Dept: PRIMARY CARE | Facility: CLINIC | Age: 69
End: 2024-09-13
Payer: MEDICARE

## 2024-09-13 VITALS
WEIGHT: 188.2 LBS | HEIGHT: 66 IN | HEART RATE: 68 BPM | SYSTOLIC BLOOD PRESSURE: 110 MMHG | TEMPERATURE: 97.6 F | BODY MASS INDEX: 30.25 KG/M2 | OXYGEN SATURATION: 98 % | DIASTOLIC BLOOD PRESSURE: 70 MMHG

## 2024-09-13 DIAGNOSIS — R73.03 PREDIABETES: ICD-10-CM

## 2024-09-13 DIAGNOSIS — E78.2 MIXED HYPERLIPIDEMIA: ICD-10-CM

## 2024-09-13 DIAGNOSIS — I10 HYPERTENSION, UNSPECIFIED TYPE: ICD-10-CM

## 2024-09-13 DIAGNOSIS — R22.1 MASS OF LATERAL NECK: ICD-10-CM

## 2024-09-13 DIAGNOSIS — N52.8 OTHER MALE ERECTILE DYSFUNCTION: ICD-10-CM

## 2024-09-13 DIAGNOSIS — Z00.00 ROUTINE GENERAL MEDICAL EXAMINATION AT HEALTH CARE FACILITY: Primary | ICD-10-CM

## 2024-09-13 DIAGNOSIS — Z12.5 SCREENING FOR PROSTATE CANCER: ICD-10-CM

## 2024-09-13 PROCEDURE — 3074F SYST BP LT 130 MM HG: CPT | Performed by: FAMILY MEDICINE

## 2024-09-13 PROCEDURE — 3078F DIAST BP <80 MM HG: CPT | Performed by: FAMILY MEDICINE

## 2024-09-13 PROCEDURE — 99214 OFFICE O/P EST MOD 30 MIN: CPT | Performed by: FAMILY MEDICINE

## 2024-09-13 PROCEDURE — 1160F RVW MEDS BY RX/DR IN RCRD: CPT | Performed by: FAMILY MEDICINE

## 2024-09-13 PROCEDURE — 3008F BODY MASS INDEX DOCD: CPT | Performed by: FAMILY MEDICINE

## 2024-09-13 PROCEDURE — 1170F FXNL STATUS ASSESSED: CPT | Performed by: FAMILY MEDICINE

## 2024-09-13 PROCEDURE — 1158F ADVNC CARE PLAN TLK DOCD: CPT | Performed by: FAMILY MEDICINE

## 2024-09-13 PROCEDURE — 1123F ACP DISCUSS/DSCN MKR DOCD: CPT | Performed by: FAMILY MEDICINE

## 2024-09-13 PROCEDURE — 1159F MED LIST DOCD IN RCRD: CPT | Performed by: FAMILY MEDICINE

## 2024-09-13 PROCEDURE — G0439 PPPS, SUBSEQ VISIT: HCPCS | Performed by: FAMILY MEDICINE

## 2024-09-13 PROCEDURE — 1036F TOBACCO NON-USER: CPT | Performed by: FAMILY MEDICINE

## 2024-09-13 RX ORDER — TADALAFIL 20 MG/1
20 TABLET ORAL DAILY PRN
Qty: 30 TABLET | Refills: 0 | Status: SHIPPED | OUTPATIENT
Start: 2024-09-13 | End: 2025-09-13

## 2024-09-13 RX ORDER — ATORVASTATIN CALCIUM 40 MG/1
40 TABLET, FILM COATED ORAL NIGHTLY
Qty: 100 TABLET | Refills: 2 | Status: SHIPPED | OUTPATIENT
Start: 2024-09-13

## 2024-09-13 ASSESSMENT — PATIENT HEALTH QUESTIONNAIRE - PHQ9
SUM OF ALL RESPONSES TO PHQ9 QUESTIONS 1 AND 2: 0
2. FEELING DOWN, DEPRESSED OR HOPELESS: NOT AT ALL
SUM OF ALL RESPONSES TO PHQ9 QUESTIONS 1 AND 2: 0
2. FEELING DOWN, DEPRESSED OR HOPELESS: NOT AT ALL
1. LITTLE INTEREST OR PLEASURE IN DOING THINGS: NOT AT ALL
1. LITTLE INTEREST OR PLEASURE IN DOING THINGS: NOT AT ALL

## 2024-09-13 ASSESSMENT — ENCOUNTER SYMPTOMS
SHORTNESS OF BREATH: 0
EYE PAIN: 0
BLOOD IN STOOL: 0
VOMITING: 0
ACTIVITY CHANGE: 0
ARTHRALGIAS: 1
NAUSEA: 0
MYALGIAS: 0
CONSTIPATION: 0
DYSPHORIC MOOD: 0
HEADACHES: 0
DIFFICULTY URINATING: 0
DIARRHEA: 0
ADENOPATHY: 0
SLEEP DISTURBANCE: 0
ABDOMINAL PAIN: 0
PALPITATIONS: 0
APPETITE CHANGE: 0
FEVER: 0
BRUISES/BLEEDS EASILY: 0
WEAKNESS: 0
COUGH: 0
NERVOUS/ANXIOUS: 0
SINUS PAIN: 0

## 2024-09-13 ASSESSMENT — ACTIVITIES OF DAILY LIVING (ADL)
MANAGING_FINANCES: INDEPENDENT
DRESSING: INDEPENDENT
GROCERY_SHOPPING: INDEPENDENT
BATHING: INDEPENDENT
DOING_HOUSEWORK: INDEPENDENT
TAKING_MEDICATION: INDEPENDENT

## 2024-09-18 ENCOUNTER — DOCUMENTATION (OUTPATIENT)
Dept: PRIMARY CARE | Facility: CLINIC | Age: 69
End: 2024-09-18
Payer: MEDICARE

## 2024-09-18 ENCOUNTER — PATIENT OUTREACH (OUTPATIENT)
Dept: PRIMARY CARE | Facility: CLINIC | Age: 69
End: 2024-09-18
Payer: MEDICARE

## 2024-09-18 RX ORDER — ASPIRIN 81 MG/1
81 TABLET ORAL DAILY
COMMUNITY
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
## 2024-09-20 ENCOUNTER — HOSPITAL ENCOUNTER (OUTPATIENT)
Dept: RADIOLOGY | Facility: HOSPITAL | Age: 69
Discharge: HOME | End: 2024-09-20
Payer: MEDICARE

## 2024-09-20 DIAGNOSIS — R22.1 MASS OF LATERAL NECK: ICD-10-CM

## 2024-09-20 PROCEDURE — 76536 US EXAM OF HEAD AND NECK: CPT

## 2024-09-23 ENCOUNTER — APPOINTMENT (OUTPATIENT)
Dept: PRIMARY CARE | Facility: CLINIC | Age: 69
End: 2024-09-23
Payer: MEDICARE

## 2024-09-23 VITALS — DIASTOLIC BLOOD PRESSURE: 90 MMHG | SYSTOLIC BLOOD PRESSURE: 160 MMHG

## 2024-09-23 DIAGNOSIS — R73.03 PREDIABETES: ICD-10-CM

## 2024-09-23 DIAGNOSIS — R22.1 MASS OF LATERAL NECK: Primary | ICD-10-CM

## 2024-09-23 DIAGNOSIS — S09.90XS: ICD-10-CM

## 2024-09-23 DIAGNOSIS — I25.10 CAD IN NATIVE ARTERY: ICD-10-CM

## 2024-09-23 DIAGNOSIS — R00.1 BRADYCARDIA: ICD-10-CM

## 2024-09-23 PROCEDURE — 3077F SYST BP >= 140 MM HG: CPT | Performed by: FAMILY MEDICINE

## 2024-09-23 PROCEDURE — 1036F TOBACCO NON-USER: CPT | Performed by: FAMILY MEDICINE

## 2024-09-23 PROCEDURE — 99496 TRANSJ CARE MGMT HIGH F2F 7D: CPT | Performed by: FAMILY MEDICINE

## 2024-09-23 PROCEDURE — 1160F RVW MEDS BY RX/DR IN RCRD: CPT | Performed by: FAMILY MEDICINE

## 2024-09-23 PROCEDURE — 1159F MED LIST DOCD IN RCRD: CPT | Performed by: FAMILY MEDICINE

## 2024-09-23 PROCEDURE — 3080F DIAST BP >= 90 MM HG: CPT | Performed by: FAMILY MEDICINE

## 2024-09-23 PROCEDURE — 1123F ACP DISCUSS/DSCN MKR DOCD: CPT | Performed by: FAMILY MEDICINE

## 2024-09-23 RX ORDER — SULFAMETHOXAZOLE AND TRIMETHOPRIM 800; 160 MG/1; MG/1
1 TABLET ORAL 2 TIMES DAILY
Qty: 10 TABLET | Refills: 0 | Status: SHIPPED | OUTPATIENT
Start: 2024-09-23 | End: 2024-09-28
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
## 2024-09-24 ENCOUNTER — PATIENT OUTREACH (OUTPATIENT)
Dept: PRIMARY CARE | Facility: CLINIC | Age: 69
End: 2024-09-24
Payer: MEDICARE

## 2024-09-24 NOTE — PROGRESS NOTES
Unable to reach patient for call back after patient's follow up appointment with PCP.   SHARLENEM with call back number for patient to call if needed   If no voicemail available call attempts x 2 were made to contact the patient to assist with any questions or concerns patient may have.

## 2024-10-22 ENCOUNTER — PATIENT OUTREACH (OUTPATIENT)
Dept: PRIMARY CARE | Facility: CLINIC | Age: 69
End: 2024-10-22
Payer: MEDICARE

## 2024-10-31 ENCOUNTER — APPOINTMENT (OUTPATIENT)
Dept: OTOLARYNGOLOGY | Facility: CLINIC | Age: 69
End: 2024-10-31
Payer: MEDICARE

## 2024-10-31 VITALS — WEIGHT: 190 LBS | BODY MASS INDEX: 30.67 KG/M2

## 2024-10-31 DIAGNOSIS — R22.1 MASS OF LATERAL NECK: Primary | ICD-10-CM

## 2024-10-31 DIAGNOSIS — H81.11 BENIGN PAROXYSMAL POSITIONAL VERTIGO OF RIGHT EAR: ICD-10-CM

## 2024-10-31 PROCEDURE — 1123F ACP DISCUSS/DSCN MKR DOCD: CPT | Performed by: GENERAL PRACTICE

## 2024-10-31 PROCEDURE — 1159F MED LIST DOCD IN RCRD: CPT | Performed by: GENERAL PRACTICE

## 2024-10-31 PROCEDURE — 99203 OFFICE O/P NEW LOW 30 MIN: CPT | Performed by: GENERAL PRACTICE

## 2024-12-16 ENCOUNTER — PATIENT OUTREACH (OUTPATIENT)
Dept: PRIMARY CARE | Facility: CLINIC | Age: 69
End: 2024-12-16
Payer: MEDICARE

## 2024-12-16 NOTE — PROGRESS NOTES
90 day outreach complete. Pt questions and concerns addressed. Pt states they are doing well. Pt has graduated from [facility] program.

## 2025-03-19 ENCOUNTER — APPOINTMENT (OUTPATIENT)
Dept: PRIMARY CARE | Facility: CLINIC | Age: 70
End: 2025-03-19
Payer: MEDICARE

## 2025-03-19 VITALS
DIASTOLIC BLOOD PRESSURE: 80 MMHG | SYSTOLIC BLOOD PRESSURE: 110 MMHG | BODY MASS INDEX: 30.86 KG/M2 | WEIGHT: 191.2 LBS | TEMPERATURE: 98 F

## 2025-03-19 DIAGNOSIS — E78.2 MIXED HYPERLIPIDEMIA: ICD-10-CM

## 2025-03-19 DIAGNOSIS — I10 HYPERTENSION, UNSPECIFIED TYPE: ICD-10-CM

## 2025-03-19 DIAGNOSIS — R73.03 PREDIABETES: Primary | ICD-10-CM

## 2025-03-19 DIAGNOSIS — Z12.5 SPECIAL SCREENING FOR MALIGNANT NEOPLASM OF PROSTATE: ICD-10-CM

## 2025-03-19 DIAGNOSIS — N52.8 OTHER MALE ERECTILE DYSFUNCTION: ICD-10-CM

## 2025-03-19 PROCEDURE — 1159F MED LIST DOCD IN RCRD: CPT | Performed by: FAMILY MEDICINE

## 2025-03-19 PROCEDURE — 3074F SYST BP LT 130 MM HG: CPT | Performed by: FAMILY MEDICINE

## 2025-03-19 PROCEDURE — 3079F DIAST BP 80-89 MM HG: CPT | Performed by: FAMILY MEDICINE

## 2025-03-19 PROCEDURE — 1160F RVW MEDS BY RX/DR IN RCRD: CPT | Performed by: FAMILY MEDICINE

## 2025-03-19 PROCEDURE — 99214 OFFICE O/P EST MOD 30 MIN: CPT | Performed by: FAMILY MEDICINE

## 2025-03-19 PROCEDURE — 1123F ACP DISCUSS/DSCN MKR DOCD: CPT | Performed by: FAMILY MEDICINE

## 2025-03-19 RX ORDER — TADALAFIL 20 MG/1
20 TABLET ORAL DAILY PRN
Qty: 30 TABLET | Refills: 2 | Status: SHIPPED | OUTPATIENT
Start: 2025-03-19 | End: 2026-03-19

## 2025-03-19 ASSESSMENT — ENCOUNTER SYMPTOMS
HEADACHES: 0
DIFFICULTY URINATING: 0
SINUS PAIN: 0
VOMITING: 0
APPETITE CHANGE: 0
DIARRHEA: 0
SHORTNESS OF BREATH: 0
ARTHRALGIAS: 0
BLOOD IN STOOL: 0
COUGH: 0
ADENOPATHY: 0
CONSTIPATION: 0
PALPITATIONS: 0
EYE PAIN: 0
NERVOUS/ANXIOUS: 0
FEVER: 0
DYSPHORIC MOOD: 0
SLEEP DISTURBANCE: 0
ABDOMINAL PAIN: 0
NAUSEA: 0
MYALGIAS: 0
BRUISES/BLEEDS EASILY: 0
WEAKNESS: 0
ACTIVITY CHANGE: 0

## 2025-03-19 ASSESSMENT — PATIENT HEALTH QUESTIONNAIRE - PHQ9
1. LITTLE INTEREST OR PLEASURE IN DOING THINGS: NOT AT ALL
2. FEELING DOWN, DEPRESSED OR HOPELESS: NOT AT ALL
SUM OF ALL RESPONSES TO PHQ9 QUESTIONS 1 AND 2: 0

## 2025-03-19 NOTE — PROGRESS NOTES
Subjective   Patient ID: John Ochoa is a 69 y.o. male who presents for Follow-up.    HPI   Care team   cardiology- Dr Wakefield-last seen 1/20 CAD asymptomatic found on elevated coronary artery calcium score-he is taking statin aspirin and fish oil. Had stress test 1/20 that showed no inducible ischemia  GI- Dr. Lehman -colonoscopy done 10/23/23- next due 3 yrs  derm- due for f/u    Nutritionist- prn now     ED- uses cialis-  Works well       For his prediabetes and hyperlipidemia he has been trying to eat healthy and stay active      Has been getting stem cell injection in his knee and feels like it is helping      HM  colonoscopy done 10/23- Dr. Lehman due 2026  PSA recheck due  AAA screen normal 4/22  He has had coronary artery calcium score done 3/19   He has a 15-pack-year smoking history that was confused prior. Is smoking 1-2 cigarette a day now. Declines further screens       Review of Systems   Constitutional:  Negative for activity change, appetite change and fever.   HENT:  Negative for congestion, ear pain and sinus pain.    Eyes:  Negative for pain and visual disturbance.   Respiratory:  Negative for cough and shortness of breath.    Cardiovascular:  Negative for chest pain, palpitations and leg swelling.   Gastrointestinal:  Negative for abdominal pain, blood in stool, constipation, diarrhea, nausea and vomiting.   Endocrine: Negative for cold intolerance and heat intolerance.   Genitourinary:  Negative for difficulty urinating.   Musculoskeletal:  Negative for arthralgias, gait problem and myalgias.   Skin:  Negative for rash.   Neurological:  Negative for weakness and headaches.   Hematological:  Negative for adenopathy. Does not bruise/bleed easily.   Psychiatric/Behavioral:  Negative for dysphoric mood and sleep disturbance. The patient is not nervous/anxious.        Objective   /80   Temp 36.7 °C (98 °F)   Wt 86.7 kg (191 lb 3.2 oz)   BMI 30.86 kg/m²     Physical Exam  Vitals and nursing  note reviewed.   Constitutional:       Appearance: Normal appearance.   HENT:      Head: Normocephalic and atraumatic.      Nose: Nose normal.      Mouth/Throat:      Mouth: Mucous membranes are moist.   Eyes:      Pupils: Pupils are equal, round, and reactive to light.   Cardiovascular:      Rate and Rhythm: Normal rate and regular rhythm.      Pulses: Normal pulses.      Heart sounds: Normal heart sounds.   Pulmonary:      Effort: Pulmonary effort is normal.      Breath sounds: Normal breath sounds.   Musculoskeletal:         General: No swelling. Normal range of motion.      Cervical back: Normal range of motion and neck supple.      Right lower leg: No edema.      Left lower leg: No edema.   Skin:     Capillary Refill: Capillary refill takes less than 2 seconds.   Neurological:      General: No focal deficit present.      Mental Status: He is alert and oriented to person, place, and time. Mental status is at baseline.   Psychiatric:         Mood and Affect: Mood normal.         Behavior: Behavior normal.         Thought Content: Thought content normal.         Judgment: Judgment normal.         Assessment/Plan   Problem List Items Addressed This Visit       HTN (hypertension)    Relevant Orders    CBC and Auto Differential    Lipid Panel    Comprehensive Metabolic Panel    Mixed hyperlipidemia    Relevant Orders    CBC and Auto Differential    Lipid Panel    Comprehensive Metabolic Panel    Prediabetes - Primary    Relevant Orders    Hemoglobin A1C    CBC and Auto Differential    Other male erectile dysfunction    Relevant Medications    tadalafil 20 mg tablet     Other Visit Diagnoses       Special screening for malignant neoplasm of prostate        Relevant Orders    Prostate Specific Antigen, Screen    CBC and Auto Differential        Blood pressure is controlled continue on current medications  Updates as above for labs-discussed PSA due now but he wishes to wait until rest of labs due 9/24  Declines further  colon cancer screening test  Dw pt keto not best diet for CAD- eating a lot of red meat/pork   Bill E Treva, DO

## 2025-06-27 DIAGNOSIS — E78.2 MIXED HYPERLIPIDEMIA: ICD-10-CM

## 2025-06-27 DIAGNOSIS — I10 HYPERTENSION, UNSPECIFIED TYPE: ICD-10-CM

## 2025-06-30 RX ORDER — LISINOPRIL AND HYDROCHLOROTHIAZIDE 20; 25 MG/1; MG/1
1 TABLET ORAL DAILY
Qty: 100 TABLET | Refills: 0 | Status: SHIPPED | OUTPATIENT
Start: 2025-06-30

## 2025-06-30 RX ORDER — ATORVASTATIN CALCIUM 40 MG/1
40 TABLET, FILM COATED ORAL NIGHTLY
Qty: 100 TABLET | Refills: 0 | Status: SHIPPED | OUTPATIENT
Start: 2025-06-30

## 2025-09-24 ENCOUNTER — APPOINTMENT (OUTPATIENT)
Dept: PRIMARY CARE | Facility: CLINIC | Age: 70
End: 2025-09-24
Payer: MEDICARE